# Patient Record
Sex: MALE | Race: WHITE
[De-identification: names, ages, dates, MRNs, and addresses within clinical notes are randomized per-mention and may not be internally consistent; named-entity substitution may affect disease eponyms.]

---

## 2016-12-05 VITALS
DIASTOLIC BLOOD PRESSURE: 63 MMHG | DIASTOLIC BLOOD PRESSURE: 63 MMHG | DIASTOLIC BLOOD PRESSURE: 63 MMHG | SYSTOLIC BLOOD PRESSURE: 104 MMHG | SYSTOLIC BLOOD PRESSURE: 104 MMHG | DIASTOLIC BLOOD PRESSURE: 63 MMHG | SYSTOLIC BLOOD PRESSURE: 104 MMHG | SYSTOLIC BLOOD PRESSURE: 104 MMHG

## 2017-03-21 ENCOUNTER — HOSPITAL ENCOUNTER (OUTPATIENT)
Dept: HOSPITAL 19 - WSPT | Age: 77
LOS: 9 days | Discharge: HOME | End: 2017-03-30
Payer: MEDICARE

## 2017-03-21 DIAGNOSIS — M43.10: ICD-10-CM

## 2017-03-21 DIAGNOSIS — M51.26: ICD-10-CM

## 2017-03-21 DIAGNOSIS — E65: Primary | ICD-10-CM

## 2017-05-25 ENCOUNTER — HOSPITAL ENCOUNTER (OUTPATIENT)
Dept: HOSPITAL 6 - LAB | Age: 77
End: 2017-05-25
Attending: FAMILY MEDICINE
Payer: MEDICARE

## 2017-05-25 DIAGNOSIS — E11.9: Primary | ICD-10-CM

## 2017-05-25 DIAGNOSIS — E78.4: ICD-10-CM

## 2018-06-01 ENCOUNTER — HOSPITAL ENCOUNTER (OUTPATIENT)
Dept: HOSPITAL 6 - RAD | Age: 78
End: 2018-06-01
Attending: FAMILY MEDICINE
Payer: MEDICARE

## 2018-06-01 DIAGNOSIS — G89.29: ICD-10-CM

## 2018-06-01 DIAGNOSIS — E78.5: ICD-10-CM

## 2018-06-01 DIAGNOSIS — M17.11: Primary | ICD-10-CM

## 2018-06-01 DIAGNOSIS — E65: ICD-10-CM

## 2018-06-01 DIAGNOSIS — I10: ICD-10-CM

## 2018-06-01 DIAGNOSIS — M54.5: ICD-10-CM

## 2018-06-02 ENCOUNTER — HOSPITAL ENCOUNTER (OUTPATIENT)
Dept: HOSPITAL 19 - ZLAB.WCH | Age: 78
End: 2018-06-02

## 2018-06-02 ENCOUNTER — HOSPITAL ENCOUNTER (OUTPATIENT)
Dept: HOSPITAL 6 - LAB | Age: 78
End: 2018-06-02
Attending: FAMILY MEDICINE
Payer: MEDICARE

## 2018-06-02 DIAGNOSIS — E11.9: Primary | ICD-10-CM

## 2018-06-02 DIAGNOSIS — Z01.89: Primary | ICD-10-CM

## 2018-06-02 DIAGNOSIS — I10: ICD-10-CM

## 2018-06-02 DIAGNOSIS — E78.5: ICD-10-CM

## 2018-06-02 LAB
ALBUMIN SERPL-MCNC: 4.1 G/DL (ref 3.5–5)
ALT SERPL-CCNC: 24 U/L (ref 21–72)
AST SERPL-CCNC: 20 U/L (ref 17–59)
BILIRUB SERPL-MCNC: 0.7 MG/DL (ref 0.2–1.3)
BUN/CREAT SERPL: 28.1 (ref 6–26)
CALCIUM SERPL-MCNC: 9.6 MG/DL (ref 8.4–10.2)
CO2 SERPL-SCNC: 28 MMOL/L (ref 22–30)
GLUCOSE SERPL-MCNC: 151 MG/DL (ref 75–110)
POTASSIUM SERPL-SCNC: 4.9 MMOL/L (ref 3.6–5)
PROT SERPL-MCNC: 7.1 G/DL (ref 6.3–8.2)
SODIUM SERPL-SCNC: 142 MMOL/L (ref 137–145)

## 2018-06-09 ENCOUNTER — HOSPITAL ENCOUNTER (EMERGENCY)
Dept: HOSPITAL 19 - COL.ER | Age: 78
Discharge: HOME | End: 2018-06-09
Payer: MEDICARE

## 2018-06-09 VITALS — WEIGHT: 302.03 LBS | HEIGHT: 70 IN | BODY MASS INDEX: 43.24 KG/M2

## 2018-06-09 VITALS — DIASTOLIC BLOOD PRESSURE: 91 MMHG | SYSTOLIC BLOOD PRESSURE: 192 MMHG | HEART RATE: 90 BPM | TEMPERATURE: 98 F

## 2018-06-09 DIAGNOSIS — Z79.82: ICD-10-CM

## 2018-06-09 DIAGNOSIS — Z86.73: ICD-10-CM

## 2018-06-09 DIAGNOSIS — E66.9: ICD-10-CM

## 2018-06-09 DIAGNOSIS — E78.5: ICD-10-CM

## 2018-06-09 DIAGNOSIS — Z87.891: ICD-10-CM

## 2018-06-09 DIAGNOSIS — R06.02: Primary | ICD-10-CM

## 2018-06-09 DIAGNOSIS — I10: ICD-10-CM

## 2018-06-09 LAB
ALBUMIN SERPL-MCNC: 3.9 GM/DL (ref 3.5–5)
ALP SERPL-CCNC: 79 U/L (ref 50–136)
ALT SERPL-CCNC: 30 U/L (ref 21–72)
ANION GAP SERPL CALC-SCNC: 13 MMOL/L (ref 7–16)
AST SERPL-CCNC: 20 U/L (ref 15–37)
BASOPHILS # BLD: 0 10*3/UL (ref 0–0.2)
BASOPHILS NFR BLD AUTO: 0.4 % (ref 0–2)
BILIRUB SERPL-MCNC: 0.5 MG/DL (ref 0–1)
BUN SERPL-MCNC: 19 MG/DL (ref 9–20)
CALCIUM SERPL-MCNC: 9.6 MG/DL (ref 8.4–10.2)
CHLORIDE SERPL-SCNC: 102 MMOL/L (ref 98–107)
CO2 SERPL-SCNC: 26 MMOL/L (ref 22–30)
CREAT SERPL-SCNC: 0.86 MG/DL (ref 0.66–1.25)
CRP SERPL-MCNC: 0.6 MG/DL (ref 0–0.9)
EOSINOPHIL # BLD: 0.2 10*3/UL (ref 0–0.7)
EOSINOPHIL NFR BLD: 2.9 % (ref 0–4)
ERYTHROCYTE [DISTWIDTH] IN BLOOD BY AUTOMATED COUNT: 13.9 % (ref 11.5–14.5)
GLUCOSE SERPL-MCNC: 174 MG/DL (ref 74–106)
GRANULOCYTES # BLD AUTO: 58.9 % (ref 42.2–75.2)
HCT VFR BLD AUTO: 38.5 % (ref 42–52)
HGB BLD-MCNC: 13.4 G/DL (ref 13.5–18)
LYMPHOCYTES # BLD: 1.6 10*3/UL (ref 1.2–3.4)
LYMPHOCYTES NFR BLD: 30.5 % (ref 20–51)
MCH RBC QN AUTO: 31 PG (ref 27–31)
MCHC RBC AUTO-ENTMCNC: 35 G/DL (ref 33–37)
MCV RBC AUTO: 90 FL (ref 80–100)
MONOCYTES # BLD: 0.4 10*3/UL (ref 0.1–0.6)
MONOCYTES NFR BLD AUTO: 7.1 % (ref 1.7–9.3)
NEUTROPHILS # BLD: 3.1 10*3/UL (ref 1.4–6.5)
PLATELET # BLD AUTO: 147 K/MM3 (ref 130–400)
PMV BLD AUTO: 10.5 FL (ref 7.4–10.4)
POTASSIUM SERPL-SCNC: 4 MMOL/L (ref 3.4–5)
PROT SERPL-MCNC: 7.2 GM/DL (ref 6.4–8.2)
RBC # BLD AUTO: 4.27 M/MM3 (ref 4.2–5.6)
SODIUM SERPL-SCNC: 141 MMOL/L (ref 137–145)
TROPONIN I SERPL-MCNC: < 0.012 NG/ML (ref 0–0.03)

## 2018-07-16 ENCOUNTER — HOSPITAL ENCOUNTER (OUTPATIENT)
Dept: HOSPITAL 6 - RAD | Age: 78
End: 2018-07-16
Attending: FAMILY MEDICINE
Payer: MEDICARE

## 2018-07-16 DIAGNOSIS — I35.8: Primary | ICD-10-CM

## 2018-07-16 DIAGNOSIS — R93.1: ICD-10-CM

## 2019-05-13 ENCOUNTER — HOSPITAL ENCOUNTER (OUTPATIENT)
Dept: HOSPITAL 19 - WSPT | Age: 79
LOS: 29 days | Discharge: HOME | End: 2019-06-11
Payer: MEDICARE

## 2019-05-13 DIAGNOSIS — M19.071: ICD-10-CM

## 2019-05-13 DIAGNOSIS — M43.10: ICD-10-CM

## 2019-05-13 DIAGNOSIS — M17.11: Primary | ICD-10-CM

## 2019-06-21 ENCOUNTER — HOSPITAL ENCOUNTER (EMERGENCY)
Dept: HOSPITAL 6 - ED | Age: 79
LOS: 1 days | Discharge: HOME | End: 2019-06-22
Payer: MEDICARE

## 2019-06-21 VITALS — WEIGHT: 296.52 LBS | BODY MASS INDEX: 43.92 KG/M2 | HEIGHT: 69.02 IN

## 2019-06-21 DIAGNOSIS — I10: ICD-10-CM

## 2019-06-21 DIAGNOSIS — N39.0: ICD-10-CM

## 2019-06-21 DIAGNOSIS — E78.5: ICD-10-CM

## 2019-06-21 DIAGNOSIS — N13.9: Primary | ICD-10-CM

## 2019-06-22 VITALS
DIASTOLIC BLOOD PRESSURE: 94 MMHG | SYSTOLIC BLOOD PRESSURE: 139 MMHG | DIASTOLIC BLOOD PRESSURE: 94 MMHG | SYSTOLIC BLOOD PRESSURE: 139 MMHG | SYSTOLIC BLOOD PRESSURE: 139 MMHG | DIASTOLIC BLOOD PRESSURE: 94 MMHG | DIASTOLIC BLOOD PRESSURE: 94 MMHG | SYSTOLIC BLOOD PRESSURE: 139 MMHG

## 2019-06-22 LAB
APPEARANCE UR: (no result)
BASOPHILS # BLD: 0 10*3/UL (ref 0.02–0.1)
BILIRUB UR QL STRIP.AUTO: NEGATIVE
CALCIUM SERPL-MCNC: 9.8 MG/DL (ref 8.3–10.5)
CO2 SERPL-SCNC: 22 MMOL/L (ref 23–31)
COLOR UR AUTO: (no result)
EOSINOPHIL # BLD: 0.1 10*3/UL (ref 0.04–0.4)
EOSINOPHIL NFR BLD: 1.9 % (ref 0–4)
ERYTHROCYTE [DISTWIDTH] IN BLOOD BY AUTOMATED COUNT: 14.1 % (ref 11.5–14.5)
GLUCOSE SERPL-MCNC: 150 MG/DL (ref 75–110)
GLUCOSE UR QL STRIP.AUTO: NEGATIVE MG/DL
HCT VFR BLD AUTO: 44.6 % (ref 42–52)
HGB BLD-MCNC: 14.4 G/DL (ref 13.5–18)
KETONES UR QL STRIP.AUTO: NEGATIVE
LEUKOCYTE ESTERASE UR QL STRIP: (no result)
LYMPHOCYTES # BLD: 1.7 10*3/UL (ref 1.5–4)
MCH RBC QN AUTO: 29 PG (ref 27–31)
MCHC RBC AUTO-ENTMCNC: 32 G/DL (ref 33–37)
MCV RBC AUTO: 89 FL (ref 78–100)
MONOCYTES # BLD: 0.8 10*3/UL (ref 0.2–0.8)
NEUTROPHILS # BLD: 4.6 10*3/UL (ref 1.4–6.5)
NITRITE UR QL STRIP: NEGATIVE
PH UR STRIP.AUTO: 5 [PH] (ref 5–8)
PLATELET # BLD AUTO: 175 K/MM3 (ref 130–400)
PMV BLD AUTO: 10.8 FL (ref 7.4–10.4)
POTASSIUM SERPL-SCNC: 4.6 MMOL/L (ref 3.5–5.1)
PROT ?TM UR-MCNC: (no result) MG/DL
RBC # BLD AUTO: 5.01 M/MM3 (ref 4.2–5.6)
RBC UR QL AUTO: (no result)
SODIUM SERPL-SCNC: 141 MMOL/L (ref 136–145)
SP GR UR STRIP.AUTO: 1.02 (ref 1–1.03)
UROBILINOGEN UR-MCNC: NORMAL MG/DL
WBC # BLD AUTO: 7.2 K/MM3 (ref 4.8–10.8)
WBC #/AREA URNS HPF: >50 /HPF (ref 0–3)

## 2019-07-01 ENCOUNTER — HOSPITAL ENCOUNTER (OUTPATIENT)
Dept: HOSPITAL 6 - LAB | Age: 79
End: 2019-07-01
Attending: FAMILY MEDICINE
Payer: MEDICARE

## 2019-07-01 DIAGNOSIS — R79.89: ICD-10-CM

## 2019-07-01 DIAGNOSIS — E78.5: ICD-10-CM

## 2019-07-01 DIAGNOSIS — Z85.46: ICD-10-CM

## 2019-07-01 DIAGNOSIS — E05.00: ICD-10-CM

## 2019-07-01 DIAGNOSIS — F41.8: ICD-10-CM

## 2019-07-01 DIAGNOSIS — N39.0: ICD-10-CM

## 2019-07-01 DIAGNOSIS — M43.10: ICD-10-CM

## 2019-07-01 DIAGNOSIS — E11.9: ICD-10-CM

## 2019-07-01 DIAGNOSIS — R79.9: ICD-10-CM

## 2019-07-01 DIAGNOSIS — M17.11: ICD-10-CM

## 2019-07-01 DIAGNOSIS — Z00.00: Primary | ICD-10-CM

## 2019-07-01 DIAGNOSIS — I10: ICD-10-CM

## 2019-07-01 LAB
ALBUMIN SERPL-MCNC: 4.1 G/DL (ref 3.4–4.8)
ALT SERPL-CCNC: 24 U/L (ref 0–55)
APPEARANCE UR: (no result)
AST SERPL-CCNC: 14 U/L (ref 5–34)
BILIRUB SERPL-MCNC: 0.7 MG/DL (ref 0.2–1.2)
BILIRUB UR QL STRIP.AUTO: NEGATIVE
CALCIUM SERPL-MCNC: 9.5 MG/DL (ref 8.3–10.5)
CO2 SERPL-SCNC: 25 MMOL/L (ref 23–31)
COLOR UR AUTO: YELLOW
ERYTHROCYTE [DISTWIDTH] IN BLOOD BY AUTOMATED COUNT: 14 % (ref 11.5–14.5)
GLUCOSE SERPL-MCNC: 151 MG/DL (ref 75–110)
GLUCOSE UR QL STRIP.AUTO: NEGATIVE MG/DL
HCT VFR BLD AUTO: 44.7 % (ref 42–52)
HGB BLD-MCNC: 14.4 G/DL (ref 13.5–18)
KETONES UR QL STRIP.AUTO: NEGATIVE
LEUKOCYTE ESTERASE UR QL STRIP: (no result)
MCH RBC QN AUTO: 29 PG (ref 27–31)
MCHC RBC AUTO-ENTMCNC: 32 G/DL (ref 33–37)
MCV RBC AUTO: 90 FL (ref 78–100)
NITRITE UR QL STRIP: POSITIVE
PH UR STRIP.AUTO: 5 [PH] (ref 5–8)
PLATELET # BLD AUTO: 218 K/MM3 (ref 130–400)
PMV BLD AUTO: 9.6 FL (ref 7.4–10.4)
POTASSIUM SERPL-SCNC: 4 MMOL/L (ref 3.5–5.1)
PROT ?TM UR-MCNC: (no result) MG/DL
PROT SERPL-MCNC: 7.5 G/DL (ref 6.2–8.1)
RBC # BLD AUTO: 4.95 M/MM3 (ref 4.2–5.6)
RBC UR QL AUTO: NEGATIVE
SODIUM SERPL-SCNC: 138 MMOL/L (ref 136–145)
SP GR UR STRIP.AUTO: 1.02 (ref 1–1.03)
UROBILINOGEN UR-MCNC: NORMAL MG/DL
WBC # BLD AUTO: 7 K/MM3 (ref 4.8–10.8)
WBC #/AREA URNS HPF: (no result) /HPF (ref 0–3)

## 2019-07-02 LAB — T3FREE SERPL-MCNC: 2.9 PG/ML (ref 1.7–3.7)

## 2019-07-08 ENCOUNTER — HOSPITAL ENCOUNTER (OUTPATIENT)
Dept: HOSPITAL 6 - RAD | Age: 79
End: 2019-07-08
Attending: FAMILY MEDICINE
Payer: MEDICARE

## 2019-07-08 DIAGNOSIS — E05.20: Primary | ICD-10-CM

## 2019-07-08 DIAGNOSIS — R79.89: ICD-10-CM

## 2019-07-08 LAB — TSH RECEP AB SER-ACNC: (no result) [IU]/L

## 2019-07-15 ENCOUNTER — HOSPITAL ENCOUNTER (OUTPATIENT)
Dept: HOSPITAL 19 - WSC | Age: 79
LOS: 53 days | Discharge: HOME | End: 2019-09-06
Payer: MEDICARE

## 2019-07-15 DIAGNOSIS — M43.10: ICD-10-CM

## 2019-07-15 DIAGNOSIS — M19.071: ICD-10-CM

## 2019-07-15 DIAGNOSIS — M17.11: Primary | ICD-10-CM

## 2019-07-17 ENCOUNTER — HOSPITAL ENCOUNTER (OUTPATIENT)
Dept: HOSPITAL 6 - LAB | Age: 79
End: 2019-07-17
Attending: FAMILY MEDICINE
Payer: MEDICARE

## 2019-07-17 DIAGNOSIS — R32: Primary | ICD-10-CM

## 2019-07-17 LAB
APPEARANCE UR: (no result)
BILIRUB UR QL STRIP.AUTO: NEGATIVE
COLOR UR AUTO: YELLOW
GLUCOSE UR QL STRIP.AUTO: NEGATIVE MG/DL
KETONES UR QL STRIP.AUTO: NEGATIVE
LEUKOCYTE ESTERASE UR QL STRIP: (no result)
MUCOUS THREADS URNS QL MICRO: PRESENT
NITRITE UR QL STRIP: POSITIVE
PH UR STRIP.AUTO: 5.5 [PH] (ref 5–8)
PROT ?TM UR-MCNC: (no result) MG/DL
RBC UR QL AUTO: (no result)
SP GR UR STRIP.AUTO: 1.03 (ref 1–1.03)
UROBILINOGEN UR-MCNC: NORMAL MG/DL
WBC #/AREA URNS HPF: >50 /HPF (ref 0–3)

## 2019-07-18 ENCOUNTER — HOSPITAL ENCOUNTER (OUTPATIENT)
Dept: HOSPITAL 19 - COL.RAD | Age: 79
End: 2019-07-18
Attending: ORTHOPAEDIC SURGERY
Payer: MEDICARE

## 2019-07-18 DIAGNOSIS — M16.11: Primary | ICD-10-CM

## 2019-08-14 ENCOUNTER — HOSPITAL ENCOUNTER (OUTPATIENT)
Dept: HOSPITAL 19 - COL.RAD | Age: 79
End: 2019-08-14
Attending: UROLOGY
Payer: MEDICARE

## 2019-08-14 DIAGNOSIS — N30.21: Primary | ICD-10-CM

## 2019-08-28 ENCOUNTER — HOSPITAL ENCOUNTER (OUTPATIENT)
Dept: HOSPITAL 19 - SURG | Age: 79
LOS: 1 days | Discharge: LEFT BEFORE DISCHARGE | End: 2019-08-29
Attending: UROLOGY
Payer: MEDICARE

## 2019-08-28 VITALS — HEART RATE: 69 BPM | SYSTOLIC BLOOD PRESSURE: 132 MMHG | DIASTOLIC BLOOD PRESSURE: 72 MMHG

## 2019-08-28 VITALS — SYSTOLIC BLOOD PRESSURE: 132 MMHG | DIASTOLIC BLOOD PRESSURE: 75 MMHG | HEART RATE: 75 BPM

## 2019-08-28 VITALS — WEIGHT: 263.45 LBS | HEIGHT: 70 IN | BODY MASS INDEX: 37.72 KG/M2

## 2019-08-28 VITALS — SYSTOLIC BLOOD PRESSURE: 134 MMHG | DIASTOLIC BLOOD PRESSURE: 69 MMHG | HEART RATE: 73 BPM

## 2019-08-28 VITALS — SYSTOLIC BLOOD PRESSURE: 129 MMHG | DIASTOLIC BLOOD PRESSURE: 77 MMHG | HEART RATE: 50 BPM

## 2019-08-28 VITALS — SYSTOLIC BLOOD PRESSURE: 129 MMHG | DIASTOLIC BLOOD PRESSURE: 82 MMHG | HEART RATE: 91 BPM | TEMPERATURE: 98.5 F

## 2019-08-28 VITALS — HEART RATE: 50 BPM | DIASTOLIC BLOOD PRESSURE: 95 MMHG | SYSTOLIC BLOOD PRESSURE: 135 MMHG

## 2019-08-28 VITALS — DIASTOLIC BLOOD PRESSURE: 72 MMHG | SYSTOLIC BLOOD PRESSURE: 129 MMHG | HEART RATE: 71 BPM

## 2019-08-28 DIAGNOSIS — Z79.899: ICD-10-CM

## 2019-08-28 DIAGNOSIS — I97.191: ICD-10-CM

## 2019-08-28 DIAGNOSIS — E11.9: ICD-10-CM

## 2019-08-28 DIAGNOSIS — I10: ICD-10-CM

## 2019-08-28 DIAGNOSIS — R39.12: ICD-10-CM

## 2019-08-28 DIAGNOSIS — N40.1: Primary | ICD-10-CM

## 2019-08-28 DIAGNOSIS — Z79.84: ICD-10-CM

## 2019-08-28 DIAGNOSIS — I67.9: ICD-10-CM

## 2019-08-28 NOTE — NUR
Patient up from PACU at 1720. Alert and oriented x 3. Spouse and family at
bedside. CBI infusing at a  moderate rate to keep urine pink. Monreal to
dependent drainage. Post op fluids infusing to right hand. Post op VSS.
Patient ambulated to restroom, states he feels like he needs to have BM.
Shuffling gate, steady. Denies pain or further needs at this time. Ordered
Supper. Reported off to night shift.

## 2019-08-28 NOTE — NUR
The patient did not take his dose of Propranolol last night or this morning.
ArmandJOSEFINA was notified and an order to give a dose prior to surgery was
obtained and medication was given.

## 2019-08-28 NOTE — NUR
PATIENT IN BED ASLEEP. CBI RUNNING AT SLOW TO MODERATE SPEED. URINE RED TINGED
AND CLEAR. CBI TUBING HAD DISCONNECTED FROM CATHETER PORT PREVIOUSLY IN THE
NIGHT. PATIENT REPOSITIONED AND RECONNECTED. LINENS CHANGED. GOWN CHANGED.
PATIENT TAUGHT HOW TO APPLY NYSTATIN/ARISTOCORT CREAM TO GENITALS. NO FURHTER
NEEDS AT THIS TIME. WILL CONTINUE TO MONITOR.

## 2019-08-29 VITALS — HEART RATE: 73 BPM | TEMPERATURE: 98.4 F | SYSTOLIC BLOOD PRESSURE: 133 MMHG | DIASTOLIC BLOOD PRESSURE: 57 MMHG

## 2019-08-29 VITALS — SYSTOLIC BLOOD PRESSURE: 101 MMHG | DIASTOLIC BLOOD PRESSURE: 79 MMHG | TEMPERATURE: 98.3 F | HEART RATE: 63 BPM

## 2019-08-29 VITALS — TEMPERATURE: 98.5 F | SYSTOLIC BLOOD PRESSURE: 140 MMHG | DIASTOLIC BLOOD PRESSURE: 51 MMHG | HEART RATE: 65 BPM

## 2019-08-29 NOTE — NUR
Assessment completed, alert/oriented, vital signs stable, denies pain,
 has been by and ordered for prime and pull, cobos clapmed/primed
bladder with 300cc saline/ catheter removed, instructed on the 6 bottle
routine, Hospitalist were consulted and ordered and ECHO/ US here to do this
already this morning, patient refusing breakfast, denies other needs at this
time

## 2019-08-29 NOTE — NUR
Patient left AMA, Em NELSON pushed patient to front entrance in wheelchair.
Upon reaching front entrance, per Hermann report, patient stood up out of
wheelchair and urinated in hospital entryway.

## 2019-08-29 NOTE — NUR
Patient is leaving Against Medical Advice, I have thuroughly explained prime
and pull / 6 bottle routine multiple times , patient did not understand this
process after multiple times of me explaining it to him, Patient voided on the
floor as he stated the urnial we gave him was not big enough for his penis,
after getting him a different containe to void in/ he then voided 2-3 times in
the container and did not call me as I had instructed him to do after EACH
void, patient got angry and was cursing at the myself and the other staff
and stated "he was not staying any longer for and that hes been waiting
around all morning", I have explained that he also had and ECHO that needed
reviewed and he needed clearance from the Hospitalist as well prior to being
able to discharge, patient insisted on leaving, I removed his IV and Tele, he
signed AMA paper and is leaving with his son-in-law

## 2019-08-29 NOTE — NUR
Clergy exited room and reported patients urinal was full.  Entered room and
explained to patient I was going to empty urinal.  Patient stated "Don't dump
that, I have to pee in there six times".  Explained to patient that he is to
urinate in the urinal six separate times, to urinate, call for urinal to be
emptied and then urinate in the urinal when needed again.  Patient states
"that other nurse didn't tell me that, he told me I have to pee in the same
jug six times".  6-bottle routine procedure was again explained to patient,
son at bedside stated "you need to get a job, you don't know what your doing".
 Primary nurse notified.

## 2019-08-29 NOTE — NUR
GRACE met with the patient and the patient's son-in-law to discuss discharge
plan. The patient lives in [place] with his wife, Maria Ines (ph#[phone]).
He reports independence with ADLs and has two walkers and a wheelchair. The
patient's PCP is Dr. Guido Santiago and he receives his medications at [facility]. He reports no difficulties obtaining his meds. The
patient does not have advanced directives, but he was interested in obtaining
a form for DPOA-[employer]. GRACE provided. The patient plans to return home with his
wife upon discharge. No additional needs at this time.

## 2019-09-02 ENCOUNTER — HOSPITAL ENCOUNTER (EMERGENCY)
Dept: HOSPITAL 6 - ED | Age: 79
Discharge: HOME | End: 2019-09-02
Payer: MEDICARE

## 2019-09-02 VITALS — BODY MASS INDEX: 37.72 KG/M2 | HEIGHT: 70 IN | WEIGHT: 263.45 LBS

## 2019-09-02 VITALS — DIASTOLIC BLOOD PRESSURE: 68 MMHG | SYSTOLIC BLOOD PRESSURE: 134 MMHG

## 2019-09-02 DIAGNOSIS — Z91.14: ICD-10-CM

## 2019-09-02 DIAGNOSIS — Z98.890: ICD-10-CM

## 2019-09-02 DIAGNOSIS — I10: ICD-10-CM

## 2019-09-02 DIAGNOSIS — F41.9: ICD-10-CM

## 2019-09-02 DIAGNOSIS — E78.5: ICD-10-CM

## 2019-09-02 DIAGNOSIS — R53.83: ICD-10-CM

## 2019-09-02 DIAGNOSIS — E11.9: ICD-10-CM

## 2019-09-02 DIAGNOSIS — Z79.84: ICD-10-CM

## 2019-09-02 DIAGNOSIS — R51: ICD-10-CM

## 2019-09-02 DIAGNOSIS — F32.9: ICD-10-CM

## 2019-09-02 DIAGNOSIS — R42: Primary | ICD-10-CM

## 2019-09-02 LAB
ALBUMIN SERPL-MCNC: 3.9 G/DL (ref 3.4–4.8)
ALT SERPL-CCNC: 18 U/L (ref 0–55)
APPEARANCE UR: (no result)
APTT PPP: 23.3 SECONDS (ref 21–32)
AST SERPL-CCNC: 13 U/L (ref 5–34)
BILIRUB SERPL-MCNC: 0.6 MG/DL (ref 0.2–1.2)
BILIRUB UR QL STRIP.AUTO: NEGATIVE
CALCIUM SERPL-MCNC: 9.8 MG/DL (ref 8.3–10.5)
CO2 SERPL-SCNC: 22 MMOL/L (ref 23–31)
COLOR UR AUTO: YELLOW
D DIMER PPP FEU-MCNC: 1.01 MG/L FEU (ref 0.15–0.5)
ERYTHROCYTE [DISTWIDTH] IN BLOOD BY AUTOMATED COUNT: 14.9 % (ref 11.5–14.5)
GLUCOSE SERPL-MCNC: 162 MG/DL (ref 75–110)
GLUCOSE UR QL STRIP.AUTO: NEGATIVE MG/DL
HCT VFR BLD AUTO: 41.6 % (ref 42–52)
HGB BLD-MCNC: 13.8 G/DL (ref 13.5–18)
KETONES UR QL STRIP.AUTO: NEGATIVE
LEUKOCYTE ESTERASE UR QL STRIP: (no result)
LIPASE SERPL-CCNC: 25 U/L (ref 8–78)
LYMPHOCYTES NFR BLD MANUAL: 25 % (ref 20–51)
MCH RBC QN AUTO: 30 PG (ref 27–31)
MCHC RBC AUTO-ENTMCNC: 33 G/DL (ref 33–37)
MCV RBC AUTO: 90 FL (ref 78–100)
MONOCYTES NFR BLD: 10 % (ref 3–10)
MUCOUS THREADS URNS QL MICRO: PRESENT
NEUTS SEG NFR BLD MANUAL: 58 % (ref 42–75)
NITRITE UR QL STRIP: NEGATIVE
PH UR STRIP.AUTO: 5 [PH] (ref 5–8)
PLATELET # BLD AUTO: 188 K/MM3 (ref 130–400)
PMV BLD AUTO: 10.4 FL (ref 7.4–10.4)
POTASSIUM SERPL-SCNC: 3.9 MMOL/L (ref 3.5–5.1)
PROT ?TM UR-MCNC: (no result) MG/DL
PROT SERPL-MCNC: 6.7 G/DL (ref 6.2–8.1)
PROTHROMBIN TIME: 11.1 SECONDS (ref 9–12)
RBC # BLD AUTO: 4.65 M/MM3 (ref 4.2–5.6)
RBC UR QL AUTO: (no result)
SODIUM SERPL-SCNC: 141 MMOL/L (ref 136–145)
UROBILINOGEN UR-MCNC: NORMAL MG/DL
WBC # BLD AUTO: 6.2 K/MM3 (ref 4.8–10.8)
WBC #/AREA URNS HPF: (no result) /HPF (ref 0–3)

## 2019-09-04 ENCOUNTER — HOSPITAL ENCOUNTER (OUTPATIENT)
Dept: HOSPITAL 6 - AMSURD | Age: 79
End: 2019-09-04
Attending: FAMILY MEDICINE
Payer: MEDICARE

## 2019-09-04 VITALS
DIASTOLIC BLOOD PRESSURE: 80 MMHG | SYSTOLIC BLOOD PRESSURE: 122 MMHG | DIASTOLIC BLOOD PRESSURE: 80 MMHG | SYSTOLIC BLOOD PRESSURE: 122 MMHG | SYSTOLIC BLOOD PRESSURE: 122 MMHG | SYSTOLIC BLOOD PRESSURE: 122 MMHG | SYSTOLIC BLOOD PRESSURE: 122 MMHG | SYSTOLIC BLOOD PRESSURE: 122 MMHG | DIASTOLIC BLOOD PRESSURE: 80 MMHG | DIASTOLIC BLOOD PRESSURE: 80 MMHG | SYSTOLIC BLOOD PRESSURE: 122 MMHG | DIASTOLIC BLOOD PRESSURE: 80 MMHG | DIASTOLIC BLOOD PRESSURE: 80 MMHG | DIASTOLIC BLOOD PRESSURE: 80 MMHG

## 2019-09-04 VITALS — WEIGHT: 260.59 LBS | HEIGHT: 70 IN | BODY MASS INDEX: 37.31 KG/M2

## 2019-09-04 DIAGNOSIS — E07.89: ICD-10-CM

## 2019-09-04 DIAGNOSIS — B35.1: ICD-10-CM

## 2019-09-04 DIAGNOSIS — E11.9: ICD-10-CM

## 2019-09-04 DIAGNOSIS — I10: ICD-10-CM

## 2019-09-04 DIAGNOSIS — I49.9: Primary | ICD-10-CM

## 2019-09-19 ENCOUNTER — HOSPITAL ENCOUNTER (OUTPATIENT)
Dept: HOSPITAL 19 - COL.RAD | Age: 79
End: 2019-09-19
Attending: NURSE PRACTITIONER
Payer: MEDICARE

## 2019-09-19 VITALS — HEART RATE: 71 BPM | DIASTOLIC BLOOD PRESSURE: 60 MMHG | SYSTOLIC BLOOD PRESSURE: 110 MMHG

## 2019-09-19 VITALS — BODY MASS INDEX: 36.58 KG/M2 | HEIGHT: 70 IN | WEIGHT: 255.52 LBS

## 2019-09-19 DIAGNOSIS — E04.2: Primary | ICD-10-CM

## 2019-09-19 NOTE — NUR
Dr Mendez into talk with pt.  No biopsy recommended due to no change in size of
nodule in 6 years.  Pt dressed and out to car per wheelchair.

## 2019-09-24 ENCOUNTER — HOSPITAL ENCOUNTER (OUTPATIENT)
Dept: HOSPITAL 19 - COL.RAD | Age: 79
End: 2019-09-24
Attending: ORTHOPAEDIC SURGERY
Payer: MEDICARE

## 2019-09-24 DIAGNOSIS — M16.11: Primary | ICD-10-CM

## 2019-11-26 ENCOUNTER — HOSPITAL ENCOUNTER (OUTPATIENT)
Dept: HOSPITAL 19 - COL.RAD | Age: 79
End: 2019-11-26
Attending: ORTHOPAEDIC SURGERY
Payer: MEDICARE

## 2019-11-26 DIAGNOSIS — M25.551: Primary | ICD-10-CM

## 2020-02-19 ENCOUNTER — HOSPITAL ENCOUNTER (OUTPATIENT)
Dept: HOSPITAL 6 - LAB | Age: 80
End: 2020-02-19
Attending: FAMILY MEDICINE
Payer: MEDICARE

## 2020-02-19 DIAGNOSIS — L29.9: ICD-10-CM

## 2020-02-19 DIAGNOSIS — R73.9: ICD-10-CM

## 2020-02-19 DIAGNOSIS — B35.1: ICD-10-CM

## 2020-02-19 DIAGNOSIS — R32: Primary | ICD-10-CM

## 2020-02-19 DIAGNOSIS — Z85.46: ICD-10-CM

## 2020-02-19 LAB
ALBUMIN SERPL-MCNC: 4 G/DL (ref 3.4–4.8)
ALT SERPL-CCNC: 18 U/L (ref 0–55)
AST SERPL-CCNC: 12 U/L (ref 5–34)
BILIRUB SERPL-MCNC: 0.4 MG/DL (ref 0.2–1.2)
CALCIUM SERPL-MCNC: 8.7 MG/DL (ref 8.3–10.5)
CO2 SERPL-SCNC: 26 MMOL/L (ref 23–31)
ERYTHROCYTE [DISTWIDTH] IN BLOOD BY AUTOMATED COUNT: 13.6 % (ref 11.5–14.5)
GLUCOSE SERPL-MCNC: 212 MG/DL (ref 75–110)
HCT VFR BLD AUTO: 43.3 % (ref 42–52)
HGB BLD-MCNC: 13.7 G/DL (ref 13.5–18)
MCH RBC QN AUTO: 30 PG (ref 27–31)
MCHC RBC AUTO-ENTMCNC: 32 G/DL (ref 33–37)
MCV RBC AUTO: 93 FL (ref 78–100)
PLATELET # BLD AUTO: 182 K/MM3 (ref 130–400)
PMV BLD AUTO: 10.3 FL (ref 7.4–10.4)
POTASSIUM SERPL-SCNC: 4.4 MMOL/L (ref 3.5–5.1)
PROT SERPL-MCNC: 6.7 G/DL (ref 6.2–8.1)
RBC # BLD AUTO: 4.64 M/MM3 (ref 4.2–5.6)
SODIUM SERPL-SCNC: 140 MMOL/L (ref 136–145)
WBC # BLD AUTO: 5.6 K/MM3 (ref 4.8–10.8)

## 2020-03-20 ENCOUNTER — HOSPITAL ENCOUNTER (OUTPATIENT)
Dept: HOSPITAL 19 - COL.RAD | Age: 80
End: 2020-03-20
Attending: ORTHOPAEDIC SURGERY
Payer: MEDICARE

## 2020-03-20 DIAGNOSIS — M25.551: Primary | ICD-10-CM

## 2020-03-20 DIAGNOSIS — M25.552: ICD-10-CM

## 2020-05-29 ENCOUNTER — HOSPITAL ENCOUNTER (OUTPATIENT)
Dept: HOSPITAL 6 - LAB | Age: 80
End: 2020-05-29
Attending: FAMILY MEDICINE
Payer: MEDICARE

## 2020-05-29 DIAGNOSIS — R63.4: Primary | ICD-10-CM

## 2020-05-29 DIAGNOSIS — R73.9: ICD-10-CM

## 2020-05-29 LAB
ALBUMIN SERPL-MCNC: 4.1 G/DL (ref 3.4–4.8)
ALT SERPL-CCNC: 16 U/L (ref 0–55)
AST SERPL-CCNC: 14 U/L (ref 5–34)
BILIRUB SERPL-MCNC: 0.5 MG/DL (ref 0.2–1.2)
CALCIUM SERPL-MCNC: 9.5 MG/DL (ref 8.3–10.5)
CO2 SERPL-SCNC: 23 MMOL/L (ref 23–31)
ERYTHROCYTE [DISTWIDTH] IN BLOOD BY AUTOMATED COUNT: 14.6 % (ref 11.5–14.5)
GLUCOSE SERPL-MCNC: 173 MG/DL (ref 75–110)
HCT VFR BLD AUTO: 43.1 % (ref 42–52)
HGB BLD-MCNC: 14 G/DL (ref 13.5–18)
MCH RBC QN AUTO: 30 PG (ref 27–31)
MCHC RBC AUTO-ENTMCNC: 33 G/DL (ref 33–37)
MCV RBC AUTO: 91 FL (ref 78–100)
PLATELET # BLD AUTO: 213 K/MM3 (ref 130–400)
PMV BLD AUTO: 10.1 FL (ref 7.4–10.4)
POTASSIUM SERPL-SCNC: 4.6 MMOL/L (ref 3.5–5.1)
PROT SERPL-MCNC: 7 G/DL (ref 6.2–8.1)
RBC # BLD AUTO: 4.72 M/MM3 (ref 4.2–5.6)
SODIUM SERPL-SCNC: 139 MMOL/L (ref 136–145)
WBC # BLD AUTO: 8 K/MM3 (ref 4.8–10.8)

## 2020-06-04 ENCOUNTER — HOSPITAL ENCOUNTER (OUTPATIENT)
Dept: HOSPITAL 6 - RAD | Age: 80
End: 2020-06-04
Attending: FAMILY MEDICINE
Payer: MEDICARE

## 2020-06-04 DIAGNOSIS — E04.2: Primary | ICD-10-CM

## 2020-08-26 ENCOUNTER — HOSPITAL ENCOUNTER (OUTPATIENT)
Dept: HOSPITAL 6 - RAD | Age: 80
End: 2020-08-26
Attending: FAMILY MEDICINE
Payer: MEDICARE

## 2020-08-26 DIAGNOSIS — Z01.818: Primary | ICD-10-CM

## 2020-08-26 DIAGNOSIS — Z96.651: ICD-10-CM

## 2020-08-26 DIAGNOSIS — E11.9: ICD-10-CM

## 2020-08-26 DIAGNOSIS — M17.11: ICD-10-CM

## 2020-08-26 DIAGNOSIS — I10: ICD-10-CM

## 2020-08-26 DIAGNOSIS — B35.1: ICD-10-CM

## 2020-08-26 DIAGNOSIS — E78.5: ICD-10-CM

## 2020-08-26 LAB
ALBUMIN SERPL-MCNC: 4.1 G/DL (ref 3.4–4.8)
ALT SERPL-CCNC: 19 U/L (ref 0–55)
APPEARANCE UR: (no result)
AST SERPL-CCNC: 13 U/L (ref 5–34)
BASOPHILS # BLD: 0 10*3/UL (ref 0.02–0.1)
BILIRUB DIRECT SERPL-MCNC: 0.2 MG/DL (ref 0–0.5)
BILIRUB SERPL-MCNC: 0.5 MG/DL (ref 0.2–1.2)
BILIRUB UR QL STRIP.AUTO: NEGATIVE
CALCIUM SERPL-MCNC: 9.4 MG/DL (ref 8.3–10.5)
CO2 SERPL-SCNC: 23 MMOL/L (ref 23–31)
COLOR UR AUTO: YELLOW
EOSINOPHIL # BLD: 0.2 10*3/UL (ref 0.04–0.4)
EOSINOPHIL NFR BLD: 4 % (ref 0–4)
ERYTHROCYTE [DISTWIDTH] IN BLOOD BY AUTOMATED COUNT: 13.8 % (ref 11.5–14.5)
GLUCOSE SERPL-MCNC: 128 MG/DL (ref 75–110)
GLUCOSE UR QL STRIP.AUTO: NEGATIVE MG/DL
HCT VFR BLD AUTO: 44.8 % (ref 42–52)
HGB BLD-MCNC: 14.3 G/DL (ref 13.5–18)
KETONES UR QL STRIP.AUTO: NEGATIVE
LEUKOCYTE ESTERASE UR QL STRIP: NEGATIVE
LYMPHOCYTES # BLD: 1.6 10*3/UL (ref 1.5–4)
MCH RBC QN AUTO: 29 PG (ref 27–31)
MCHC RBC AUTO-ENTMCNC: 32 G/DL (ref 33–37)
MCV RBC AUTO: 91 FL (ref 78–100)
MONOCYTES # BLD: 0.6 10*3/UL (ref 0.2–0.8)
MUCOUS THREADS URNS QL MICRO: PRESENT
NEUTROPHILS # BLD: 3.2 10*3/UL (ref 1.4–6.5)
NITRITE UR QL STRIP: NEGATIVE
PH UR STRIP.AUTO: 5 [PH] (ref 5–8)
PLATELET # BLD AUTO: 202 K/MM3 (ref 130–400)
PMV BLD AUTO: 10.3 FL (ref 7.4–10.4)
POTASSIUM SERPL-SCNC: 4.7 MMOL/L (ref 3.5–5.1)
PROT ?TM UR-MCNC: NEGATIVE MG/DL
PROT SERPL-MCNC: 6.9 G/DL (ref 6.2–8.1)
PROTHROMBIN TIME: 10.1 SECONDS (ref 9–12)
RBC # BLD AUTO: 4.9 M/MM3 (ref 4.2–5.6)
RBC UR QL AUTO: NEGATIVE
SODIUM SERPL-SCNC: 139 MMOL/L (ref 136–145)
SP GR UR STRIP.AUTO: 1.02 (ref 1–1.03)
UROBILINOGEN UR-MCNC: NORMAL MG/DL
WBC # BLD AUTO: 5.7 K/MM3 (ref 4.8–10.8)
WBC #/AREA URNS HPF: (no result) /HPF (ref 0–3)

## 2020-10-01 ENCOUNTER — HOSPITAL ENCOUNTER (OUTPATIENT)
Dept: HOSPITAL 6 - LAB | Age: 80
End: 2020-10-01
Attending: PHYSICIAN ASSISTANT
Payer: MEDICARE

## 2020-10-01 DIAGNOSIS — R05: ICD-10-CM

## 2020-10-01 DIAGNOSIS — J34.89: Primary | ICD-10-CM

## 2020-10-01 DIAGNOSIS — Z20.828: ICD-10-CM

## 2021-05-10 ENCOUNTER — HOSPITAL ENCOUNTER (OUTPATIENT)
Dept: HOSPITAL 6 - LAB | Age: 81
End: 2021-05-10
Attending: FAMILY MEDICINE
Payer: MEDICARE

## 2021-05-10 DIAGNOSIS — I10: Primary | ICD-10-CM

## 2021-05-10 DIAGNOSIS — E11.9: ICD-10-CM

## 2021-05-10 DIAGNOSIS — E78.5: ICD-10-CM

## 2021-05-10 LAB
ALBUMIN SERPL-MCNC: 3.9 G/DL (ref 3.4–4.8)
ALT SERPL-CCNC: 12 U/L (ref 0–55)
AST SERPL-CCNC: 13 U/L (ref 5–34)
BILIRUB SERPL-MCNC: 0.5 MG/DL (ref 0.2–1.2)
CALCIUM SERPL-MCNC: 9.1 MG/DL (ref 8.3–10.5)
CO2 SERPL-SCNC: 24 MMOL/L (ref 23–31)
GLUCOSE SERPL-MCNC: 121 MG/DL (ref 75–110)
POTASSIUM SERPL-SCNC: 4.1 MMOL/L (ref 3.5–5.1)
PROT SERPL-MCNC: 6.4 G/DL (ref 6.2–8.1)
SODIUM SERPL-SCNC: 142 MMOL/L (ref 136–145)

## 2021-08-20 ENCOUNTER — HOSPITAL ENCOUNTER (OUTPATIENT)
Dept: HOSPITAL 6 - RAD | Age: 81
End: 2021-08-20
Attending: FAMILY MEDICINE
Payer: MEDICARE

## 2021-08-20 DIAGNOSIS — E11.9: ICD-10-CM

## 2021-08-20 DIAGNOSIS — E04.2: Primary | ICD-10-CM

## 2021-09-03 ENCOUNTER — HOSPITAL ENCOUNTER (OUTPATIENT)
Dept: HOSPITAL 6 - RAD | Age: 81
End: 2021-09-03
Attending: FAMILY MEDICINE
Payer: MEDICARE

## 2021-09-03 DIAGNOSIS — M16.11: ICD-10-CM

## 2021-09-03 DIAGNOSIS — Z01.818: Primary | ICD-10-CM

## 2021-09-03 DIAGNOSIS — I49.8: ICD-10-CM

## 2021-09-03 LAB
APPEARANCE UR: CLEAR
BASOPHILS # BLD: 0.03 10*3/UL (ref 0.02–0.1)
BILIRUB UR QL STRIP.AUTO: (no result)
CALCIUM SERPL-MCNC: 9.9 MG/DL (ref 8.3–10.5)
CO2 SERPL-SCNC: 22 MMOL/L (ref 23–31)
COLOR UR AUTO: YELLOW
EOSINOPHIL # BLD: 0.19 10*3/UL (ref 0.04–0.4)
EOSINOPHIL NFR BLD: 3.4 % (ref 0–4)
ERYTHROCYTE [DISTWIDTH] IN BLOOD BY AUTOMATED COUNT: 13.4 % (ref 11.5–14.5)
GLUCOSE SERPL-MCNC: 128 MG/DL (ref 75–110)
GLUCOSE UR QL STRIP.AUTO: NEGATIVE MG/DL
HCT VFR BLD AUTO: 44.3 % (ref 42–52)
HGB BLD-MCNC: 14.4 G/DL (ref 13.5–18)
KETONES UR QL STRIP.AUTO: NEGATIVE
LEUKOCYTE ESTERASE UR QL STRIP: NEGATIVE
LYMPHOCYTES # BLD: 1.35 10*3/UL (ref 1.5–4)
MCH RBC QN AUTO: 30 PG (ref 27–31)
MCHC RBC AUTO-ENTMCNC: 33 G/DL (ref 33–37)
MCV RBC AUTO: 92 FL (ref 78–100)
MONOCYTES # BLD: 0.45 10*3/UL (ref 0.2–0.8)
MUCOUS THREADS URNS QL MICRO: PRESENT
NEUTROPHILS # BLD: 3.55 10*3/UL (ref 1.4–6.5)
NITRITE UR QL STRIP: NEGATIVE
PH UR STRIP.AUTO: 7 [PH] (ref 5–8)
PLATELET # BLD AUTO: 183 K/MM3 (ref 130–400)
PMV BLD AUTO: 10.3 FL (ref 7.4–10.4)
POTASSIUM SERPL-SCNC: 4.6 MMOL/L (ref 3.5–5.1)
PROT ?TM UR-MCNC: (no result) MG/DL
PROTHROMBIN TIME: 10.1 SECONDS (ref 9–12)
RBC # BLD AUTO: 4.8 M/MM3 (ref 4.2–5.6)
RBC UR QL AUTO: NEGATIVE
SODIUM SERPL-SCNC: 139 MMOL/L (ref 136–145)
SP GR UR STRIP.AUTO: 1.02 (ref 1–1.03)
UROBILINOGEN UR-MCNC: NORMAL MG/DL
WBC # BLD AUTO: 5.6 K/MM3 (ref 4.8–10.8)
WBC #/AREA URNS HPF: (no result) /HPF (ref 0–3)

## 2022-05-23 ENCOUNTER — HOSPITAL ENCOUNTER (OUTPATIENT)
Dept: HOSPITAL 6 - LAB | Age: 82
End: 2022-05-23
Attending: FAMILY MEDICINE
Payer: MEDICARE

## 2022-05-23 DIAGNOSIS — E78.5: Primary | ICD-10-CM

## 2022-05-23 DIAGNOSIS — I10: ICD-10-CM

## 2022-05-23 DIAGNOSIS — E11.9: ICD-10-CM

## 2022-05-23 LAB
ALBUMIN SERPL-MCNC: 4.2 G/DL (ref 3.4–4.8)
ALT SERPL-CCNC: 18 U/L (ref 0–55)
AST SERPL-CCNC: 16 U/L (ref 5–34)
BILIRUB SERPL-MCNC: 0.6 MG/DL (ref 0.2–1.2)
CALCIUM SERPL-MCNC: 9.8 MG/DL (ref 8.3–10.5)
CO2 SERPL-SCNC: 24 MMOL/L (ref 23–31)
GLUCOSE SERPL-MCNC: 115 MG/DL (ref 75–110)
POTASSIUM SERPL-SCNC: 4.8 MMOL/L (ref 3.5–5.1)
PROT SERPL-MCNC: 6.8 G/DL (ref 6.2–8.1)
SODIUM SERPL-SCNC: 143 MMOL/L (ref 136–145)

## 2023-01-27 ENCOUNTER — HOSPITAL ENCOUNTER (OUTPATIENT)
Dept: HOSPITAL 6 - LAB | Age: 83
End: 2023-01-27
Attending: FAMILY MEDICINE
Payer: MEDICARE

## 2023-01-27 DIAGNOSIS — E11.9: Primary | ICD-10-CM

## 2024-01-17 ENCOUNTER — HOSPITAL ENCOUNTER (OUTPATIENT)
Dept: HOSPITAL 6 - LAB | Age: 84
End: 2024-01-17
Attending: FAMILY MEDICINE
Payer: MEDICARE

## 2024-01-17 DIAGNOSIS — E11.9: ICD-10-CM

## 2024-01-17 DIAGNOSIS — I10: Primary | ICD-10-CM

## 2024-01-17 LAB
CALCIUM SERPL-MCNC: 9.2 MG/DL (ref 8.3–10.5)
CO2 SERPL-SCNC: 24 MMOL/L (ref 23–31)
GLUCOSE SERPL-MCNC: 108 MG/DL (ref 75–110)
SODIUM SERPL-SCNC: 141 MMOL/L (ref 136–145)

## 2024-01-18 LAB — CREAT UR-MCNC: 194 MG/DL (ref 47–110)

## 2024-01-29 ENCOUNTER — HOSPITAL ENCOUNTER (OUTPATIENT)
Dept: HOSPITAL 6 - LAB | Age: 84
End: 2024-01-29
Attending: FAMILY MEDICINE
Payer: MEDICARE

## 2024-01-29 DIAGNOSIS — L29.8: Primary | ICD-10-CM

## 2024-09-30 ENCOUNTER — HOSPITAL ENCOUNTER (EMERGENCY)
Dept: HOSPITAL 6 - ED | Age: 84
Discharge: HOME | End: 2024-09-30
Payer: MEDICARE

## 2024-09-30 VITALS — SYSTOLIC BLOOD PRESSURE: 109 MMHG | DIASTOLIC BLOOD PRESSURE: 73 MMHG

## 2024-09-30 DIAGNOSIS — E66.9: ICD-10-CM

## 2024-09-30 DIAGNOSIS — R06.02: Primary | ICD-10-CM

## 2024-09-30 LAB
BASOPHILS # BLD: 0.01 K/MM3 (ref 0.02–0.1)
EOSINOPHIL # BLD: 0.05 K/MM3 (ref 0.04–0.4)
EOSINOPHIL NFR BLD: 0.4 % (ref 0–4)
ERYTHROCYTE [DISTWIDTH] IN BLOOD BY AUTOMATED COUNT: 13.6 % (ref 11.5–14.5)
HCT VFR BLD AUTO: 38.9 % (ref 42–52)
HGB BLD-MCNC: 12.9 G/DL (ref 13.5–18)
LYMPHOCYTES # BLD: 0.73 K/MM3 (ref 1.5–4)
MCH RBC QN AUTO: 29 PG (ref 27–31)
MCHC RBC AUTO-ENTMCNC: 33 G/DL (ref 33–37)
MCV RBC AUTO: 89 FL (ref 78–100)
MONOCYTES # BLD: 0.81 K/MM3 (ref 0.2–0.8)
NEUTROPHILS # BLD: 11.16 K/MM3 (ref 1.4–6.5)
PLATELET # BLD AUTO: 153 K/MM3 (ref 130–400)
PMV BLD AUTO: 10.5 FL (ref 7.4–10.4)
RBC # BLD AUTO: 4.39 M/MM3 (ref 4.2–5.6)
WBC # BLD AUTO: 12.8 K/MM3 (ref 4.8–10.8)

## 2024-10-15 ENCOUNTER — HOSPITAL ENCOUNTER (INPATIENT)
Dept: HOSPITAL 6 - MED/SURG | Age: 84
LOS: 1 days | Discharge: HOME | DRG: 683 | End: 2024-10-16
Attending: INTERNAL MEDICINE | Admitting: INTERNAL MEDICINE
Payer: MEDICARE

## 2024-10-15 VITALS — DIASTOLIC BLOOD PRESSURE: 52 MMHG | SYSTOLIC BLOOD PRESSURE: 92 MMHG

## 2024-10-15 VITALS — DIASTOLIC BLOOD PRESSURE: 62 MMHG | SYSTOLIC BLOOD PRESSURE: 126 MMHG

## 2024-10-15 VITALS — DIASTOLIC BLOOD PRESSURE: 79 MMHG | SYSTOLIC BLOOD PRESSURE: 174 MMHG

## 2024-10-15 VITALS — SYSTOLIC BLOOD PRESSURE: 127 MMHG | DIASTOLIC BLOOD PRESSURE: 75 MMHG

## 2024-10-15 VITALS — BODY MASS INDEX: 33.88 KG/M2 | WEIGHT: 215.83 LBS | HEIGHT: 67.01 IN

## 2024-10-15 DIAGNOSIS — F32.A: ICD-10-CM

## 2024-10-15 DIAGNOSIS — N17.9: Primary | ICD-10-CM

## 2024-10-15 DIAGNOSIS — N39.0: ICD-10-CM

## 2024-10-15 DIAGNOSIS — Z88.5: ICD-10-CM

## 2024-10-15 DIAGNOSIS — I95.9: ICD-10-CM

## 2024-10-15 DIAGNOSIS — I10: ICD-10-CM

## 2024-10-15 DIAGNOSIS — E78.5: ICD-10-CM

## 2024-10-16 VITALS — DIASTOLIC BLOOD PRESSURE: 74 MMHG | SYSTOLIC BLOOD PRESSURE: 131 MMHG

## 2024-10-16 VITALS — DIASTOLIC BLOOD PRESSURE: 78 MMHG | SYSTOLIC BLOOD PRESSURE: 121 MMHG

## 2024-10-16 LAB
ALBUMIN SERPL-MCNC: 3.3 G/DL (ref 3.4–4.8)
ALT SERPL-CCNC: 13 U/L (ref 0–55)
AST SERPL-CCNC: 12 U/L (ref 5–34)
BASOPHILS # BLD: 0.02 K/MM3 (ref 0.02–0.1)
BILIRUB SERPL-MCNC: 0.4 MG/DL (ref 0.2–1.2)
CALCIUM SERPL-MCNC: 8.7 MG/DL (ref 8.3–10.5)
CO2 SERPL-SCNC: 20 MMOL/L (ref 23–31)
EOSINOPHIL # BLD: 0.16 K/MM3 (ref 0.04–0.4)
EOSINOPHIL NFR BLD: 2.7 % (ref 0–4)
ERYTHROCYTE [DISTWIDTH] IN BLOOD BY AUTOMATED COUNT: 13.4 % (ref 11.5–14.5)
GLUCOSE SERPL-MCNC: 106 MG/DL (ref 75–110)
HCT VFR BLD AUTO: 34.4 % (ref 42–52)
HGB BLD-MCNC: 11.1 G/DL (ref 13.5–18)
LYMPHOCYTES # BLD: 1.16 K/MM3 (ref 1.5–4)
MCH RBC QN AUTO: 29 PG (ref 27–31)
MCHC RBC AUTO-ENTMCNC: 32 G/DL (ref 33–37)
MCV RBC AUTO: 91 FL (ref 78–100)
MONOCYTES # BLD: 0.59 K/MM3 (ref 0.2–0.8)
NEUTROPHILS # BLD: 3.94 K/MM3 (ref 1.4–6.5)
PLATELET # BLD AUTO: 215 K/MM3 (ref 130–400)
PMV BLD AUTO: 11.3 FL (ref 7.4–10.4)
PROT SERPL-MCNC: 5.8 G/DL (ref 6.2–8.1)
RBC # BLD AUTO: 3.78 M/MM3 (ref 4.2–5.6)
SODIUM SERPL-SCNC: 135 MMOL/L (ref 136–145)
WBC # BLD AUTO: 5.9 K/MM3 (ref 4.8–10.8)

## 2024-11-19 ENCOUNTER — HOSPITAL ENCOUNTER (OUTPATIENT)
Dept: HOSPITAL 6 - RAD | Age: 84
End: 2024-11-19
Payer: MEDICARE

## 2024-11-19 DIAGNOSIS — M51.16: Primary | ICD-10-CM

## 2024-11-19 DIAGNOSIS — M48.061: ICD-10-CM

## 2024-11-20 ENCOUNTER — HOSPITAL ENCOUNTER (OUTPATIENT)
Dept: HOSPITAL 6 - RAD | Age: 84
End: 2024-11-20
Attending: FAMILY MEDICINE
Payer: MEDICARE

## 2024-11-20 DIAGNOSIS — M84.48XA: ICD-10-CM

## 2024-11-20 DIAGNOSIS — M89.9: ICD-10-CM

## 2024-11-20 DIAGNOSIS — N28.89: Primary | ICD-10-CM

## 2024-12-26 ENCOUNTER — HOSPITAL ENCOUNTER (OUTPATIENT)
Dept: HOSPITAL 6 - LAB | Age: 84
End: 2024-12-26
Attending: FAMILY MEDICINE
Payer: MEDICARE

## 2024-12-26 DIAGNOSIS — R53.83: Primary | ICD-10-CM

## 2024-12-26 DIAGNOSIS — E11.9: ICD-10-CM

## 2024-12-26 LAB
BASOPHILS # BLD: 0.03 K/MM3 (ref 0.02–0.1)
EOSINOPHIL # BLD: 0.17 K/MM3 (ref 0.04–0.4)
EOSINOPHIL NFR BLD: 3.2 % (ref 0–4)
ERYTHROCYTE [DISTWIDTH] IN BLOOD BY AUTOMATED COUNT: 15 % (ref 11.5–14.5)
HCT VFR BLD AUTO: 37.5 % (ref 42–52)
HGB BLD-MCNC: 13.5 G/DL (ref 13.5–18)
LYMPHOCYTES # BLD: 1.31 K/MM3 (ref 1.5–4)
MCH RBC QN AUTO: 33 PG (ref 27–31)
MCHC RBC AUTO-ENTMCNC: 36 G/DL (ref 33–37)
MCV RBC AUTO: 91 FL (ref 78–100)
MONOCYTES # BLD: 0.41 K/MM3 (ref 0.2–0.8)
NEUTROPHILS # BLD: 3.35 K/MM3 (ref 1.4–6.5)
PLATELET # BLD AUTO: 179 K/MM3 (ref 130–400)
PMV BLD AUTO: 10.5 FL (ref 7.4–10.4)
RBC # BLD AUTO: 4.14 M/MM3 (ref 4.2–5.6)
WBC # BLD AUTO: 5.3 K/MM3 (ref 4.8–10.8)

## 2025-03-14 ENCOUNTER — HOSPITAL ENCOUNTER (OUTPATIENT)
Dept: HOSPITAL 6 - LAB | Age: 85
End: 2025-03-14
Attending: INTERNAL MEDICINE
Payer: MEDICARE

## 2025-03-14 DIAGNOSIS — C64.2: Primary | ICD-10-CM

## 2025-03-14 LAB
ALBUMIN SERPL-MCNC: 3.7 G/DL (ref 3.4–4.8)
ALT SERPL-CCNC: 15 U/L (ref 0–55)
AST SERPL-CCNC: 14 U/L (ref 5–34)
BASOPHILS # BLD: 0.03 K/MM3 (ref 0.02–0.1)
BILIRUB SERPL-MCNC: 0.5 MG/DL (ref 0.2–1.2)
CALCIUM SERPL-MCNC: 9.3 MG/DL (ref 8.3–10.5)
CO2 SERPL-SCNC: 23 MMOL/L (ref 23–31)
EOSINOPHIL # BLD: 0.4 K/MM3 (ref 0.04–0.4)
EOSINOPHIL NFR BLD: 6.6 % (ref 0–4)
ERYTHROCYTE [DISTWIDTH] IN BLOOD BY AUTOMATED COUNT: 14.3 % (ref 11.5–14.5)
GLUCOSE SERPL-MCNC: 163 MG/DL (ref 75–110)
HCT VFR BLD AUTO: 37.4 % (ref 42–52)
HGB BLD-MCNC: 12.3 G/DL (ref 13.5–18)
LYMPHOCYTES # BLD: 1.36 K/MM3 (ref 1.5–4)
MAGNESIUM SERPL-MCNC: 1.75 MG/DL (ref 1.6–2.6)
MCH RBC QN AUTO: 31 PG (ref 27–31)
MCHC RBC AUTO-ENTMCNC: 33 G/DL (ref 33–37)
MCV RBC AUTO: 93 FL (ref 78–100)
MONOCYTES # BLD: 0.52 K/MM3 (ref 0.2–0.8)
NEUTROPHILS # BLD: 3.7 K/MM3 (ref 1.4–6.5)
PLATELET # BLD AUTO: 178 K/MM3 (ref 130–400)
PMV BLD AUTO: 10.1 FL (ref 7.4–10.4)
PROT SERPL-MCNC: 6.4 G/DL (ref 6.2–8.1)
RBC # BLD AUTO: 4.02 M/MM3 (ref 4.2–5.6)
SODIUM SERPL-SCNC: 137 MMOL/L (ref 136–145)
WBC # BLD AUTO: 6 K/MM3 (ref 4.8–10.8)

## 2025-03-25 ENCOUNTER — HOSPITAL ENCOUNTER (OUTPATIENT)
Dept: HOSPITAL 6 - LAB | Age: 85
End: 2025-03-25
Attending: FAMILY MEDICINE
Payer: MEDICARE

## 2025-03-25 DIAGNOSIS — E04.1: Primary | ICD-10-CM

## 2025-04-02 ENCOUNTER — HOSPITAL ENCOUNTER (OUTPATIENT)
Dept: HOSPITAL 6 - LAB | Age: 85
End: 2025-04-02
Attending: FAMILY MEDICINE
Payer: MEDICARE

## 2025-04-02 DIAGNOSIS — C64.2: Primary | ICD-10-CM

## 2025-04-02 LAB
ALBUMIN SERPL-MCNC: 3.9 G/DL (ref 3.4–4.8)
BASOPHILS # BLD: 0.01 K/MM3 (ref 0.02–0.1)
BILIRUB SERPL-MCNC: 0.5 MG/DL (ref 0.2–1.2)
CALCIUM SERPL-MCNC: 9.5 MG/DL (ref 8.3–10.5)
EOSINOPHIL # BLD: 0.03 K/MM3 (ref 0.04–0.4)
EOSINOPHIL NFR BLD: 0.5 % (ref 0–4)
ERYTHROCYTE [DISTWIDTH] IN BLOOD BY AUTOMATED COUNT: 13.8 % (ref 11.5–14.5)
HGB BLD-MCNC: 12.2 G/DL (ref 13.5–18)
LYMPHOCYTES # BLD: 1.41 K/MM3 (ref 1.5–4)
MAGNESIUM SERPL-MCNC: 1.84 MG/DL (ref 1.6–2.6)
MCH RBC QN AUTO: 30 PG (ref 27–31)
MCHC RBC AUTO-ENTMCNC: 32 G/DL (ref 33–37)
MCV RBC AUTO: 95 FL (ref 78–100)
MONOCYTES # BLD: 0.65 K/MM3 (ref 0.2–0.8)
PLATELET # BLD AUTO: 220 K/MM3 (ref 130–400)
PMV BLD AUTO: 10.1 FL (ref 7.4–10.4)
PROT SERPL-MCNC: 6.6 G/DL (ref 6.2–8.1)
RBC # BLD AUTO: 4.02 M/MM3 (ref 4.2–5.6)
WBC # BLD AUTO: 6.4 K/MM3 (ref 4.8–10.8)